# Patient Record
Sex: MALE
[De-identification: names, ages, dates, MRNs, and addresses within clinical notes are randomized per-mention and may not be internally consistent; named-entity substitution may affect disease eponyms.]

---

## 2018-11-01 ENCOUNTER — HOSPITAL ENCOUNTER (EMERGENCY)
Dept: HOSPITAL 90 - EDH | Age: 48
LOS: 1 days | Discharge: HOME | End: 2018-11-02
Payer: COMMERCIAL

## 2018-11-01 DIAGNOSIS — Z98.890: ICD-10-CM

## 2018-11-01 DIAGNOSIS — Z87.891: ICD-10-CM

## 2018-11-01 DIAGNOSIS — Z79.899: ICD-10-CM

## 2018-11-01 DIAGNOSIS — L03.116: Primary | ICD-10-CM

## 2018-11-01 LAB
BASOPHILS NFR BLD AUTO: 0.5 % (ref 0–5)
BUN SERPL-MCNC: 12 MG/DL (ref 7–18)
CHLORIDE SERPL-SCNC: 102 MMOL/L (ref 101–111)
CO2 SERPL-SCNC: 27 MMOL/L (ref 21–32)
CREAT SERPL-MCNC: 0.7 MG/DL (ref 0.5–1.5)
EOSINOPHIL NFR BLD AUTO: 0.8 % (ref 0–8)
ERYTHROCYTE [DISTWIDTH] IN BLOOD BY AUTOMATED COUNT: 14 % (ref 11–15.5)
GFR SERPL CREATININE-BSD FRML MDRD: 128 ML/MIN (ref 60–?)
GLUCOSE SERPL-MCNC: 151 MG/DL (ref 70–105)
HCT VFR BLD AUTO: 30.4 % (ref 42–54)
LYMPHOCYTES NFR SPEC AUTO: 29.4 % (ref 21–51)
MCH RBC QN AUTO: 31.1 PG (ref 27–33)
MCHC RBC AUTO-ENTMCNC: 35.7 G/DL (ref 32–36)
MCV RBC AUTO: 87.1 FL (ref 79–99)
MONOCYTES NFR BLD AUTO: 9 % (ref 3–13)
NEUTROPHILS NFR BLD AUTO: 60.3 % (ref 40–77)
NRBC BLD MANUAL-RTO: 0 % (ref 0–0.19)
PLATELET # BLD AUTO: 358 K/UL (ref 130–400)
POTASSIUM SERPL-SCNC: 4.2 MMOL/L (ref 3.5–5.1)
RBC # BLD AUTO: 3.49 MIL/UL (ref 4.5–6.2)
SODIUM SERPL-SCNC: 139 MMOL/L (ref 136–145)
WBC # BLD AUTO: 10.7 K/UL (ref 4.8–10.8)

## 2018-11-01 PROCEDURE — 85025 COMPLETE CBC W/AUTO DIFF WBC: CPT

## 2018-11-01 PROCEDURE — 93971 EXTREMITY STUDY: CPT

## 2018-11-01 PROCEDURE — 80048 BASIC METABOLIC PNL TOTAL CA: CPT

## 2018-11-01 PROCEDURE — 96374 THER/PROPH/DIAG INJ IV PUSH: CPT

## 2018-11-01 PROCEDURE — 99285 EMERGENCY DEPT VISIT HI MDM: CPT

## 2018-11-01 PROCEDURE — 36415 COLL VENOUS BLD VENIPUNCTURE: CPT

## 2019-07-14 ENCOUNTER — HOSPITAL ENCOUNTER (INPATIENT)
Dept: HOSPITAL 90 - 4AH | Age: 49
LOS: 2 days | Discharge: HOME | DRG: 560 | End: 2019-07-16
Attending: ORTHOPAEDIC SURGERY | Admitting: ORTHOPAEDIC SURGERY
Payer: COMMERCIAL

## 2019-07-14 VITALS — SYSTOLIC BLOOD PRESSURE: 143 MMHG | DIASTOLIC BLOOD PRESSURE: 67 MMHG

## 2019-07-14 VITALS — HEIGHT: 64 IN | BODY MASS INDEX: 44.22 KG/M2 | WEIGHT: 259 LBS

## 2019-07-14 VITALS — DIASTOLIC BLOOD PRESSURE: 89 MMHG | SYSTOLIC BLOOD PRESSURE: 150 MMHG

## 2019-07-14 VITALS — DIASTOLIC BLOOD PRESSURE: 66 MMHG | SYSTOLIC BLOOD PRESSURE: 146 MMHG
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
## 2019-07-14 DIAGNOSIS — M19.90: ICD-10-CM

## 2019-07-14 DIAGNOSIS — Z83.3: ICD-10-CM

## 2019-07-14 DIAGNOSIS — F17.200: ICD-10-CM

## 2019-07-14 DIAGNOSIS — E66.9: ICD-10-CM

## 2019-07-14 DIAGNOSIS — T84.54XA: Primary | ICD-10-CM

## 2019-07-14 DIAGNOSIS — Z96.653: ICD-10-CM

## 2019-07-14 DIAGNOSIS — D70.9: ICD-10-CM

## 2019-07-14 DIAGNOSIS — D64.9: ICD-10-CM

## 2019-07-14 DIAGNOSIS — E11.9: ICD-10-CM

## 2019-07-14 LAB
APTT PPP: 28.2 SEC (ref 26.3–35.5)
BASOPHILS NFR BLD MANUAL: 4 % (ref 0–2)
BUN SERPL-MCNC: 11 MG/DL (ref 7–18)
CHLORIDE SERPL-SCNC: 104 MMOL/L (ref 101–111)
CO2 SERPL-SCNC: 26 MMOL/L (ref 21–32)
CREAT SERPL-MCNC: 0.8 MG/DL (ref 0.5–1.5)
ERYTHROCYTE [DISTWIDTH] IN BLOOD BY AUTOMATED COUNT: 14.2 % (ref 11–15.5)
GFR SERPL CREATININE-BSD FRML MDRD: 110 ML/MIN (ref 60–?)
GLUCOSE SERPL-MCNC: 111 MG/DL (ref 70–105)
HCT VFR BLD AUTO: 29.5 % (ref 42–54)
INR PPP: 1 (ref 0.85–1.15)
LYMPHOCYTES NFR BLD MANUAL: 44 % (ref 22–44)
MCH RBC QN AUTO: 29.9 PG (ref 27–33)
MCHC RBC AUTO-ENTMCNC: 34.8 G/DL (ref 32–36)
MCV RBC AUTO: 85.9 FL (ref 79–99)
MONOCYTES NFR BLD MANUAL: 20 % (ref 2–9)
NEUTS SEG NFR BLD MANUAL: 20 % (ref 40–70)
NRBC BLD MANUAL-RTO: 0.1 % (ref 0–0.19)
PH UR STRIP: 7 [PH] (ref 5–8)
PLAT MORPH BLD: ADEQUATE
PLATELET # BLD AUTO: 213 K/UL (ref 130–400)
POTASSIUM SERPL-SCNC: 4.2 MMOL/L (ref 3.5–5.1)
PROTHROMBIN TIME: 10.5 SEC (ref 9.6–11.6)
RBC # BLD AUTO: 3.44 MIL/UL (ref 4.5–6.2)
RBC MORPH BLD: (no result)
SODIUM SERPL-SCNC: 141 MMOL/L (ref 136–145)
SP GR UR STRIP: 1.01 (ref 1–1.03)
UROBILINOGEN UR STRIP-MCNC: 0.2 MG/DL (ref 0.2–1)
VARIANT LYMPHS NFR BLD MANUAL: 12 % (ref 0–0)
WBC # BLD AUTO: 2.5 K/UL (ref 4.8–10.8)

## 2019-07-14 PROCEDURE — 86850 RBC ANTIBODY SCREEN: CPT

## 2019-07-14 PROCEDURE — 36415 COLL VENOUS BLD VENIPUNCTURE: CPT

## 2019-07-14 PROCEDURE — 81003 URINALYSIS AUTO W/O SCOPE: CPT

## 2019-07-14 PROCEDURE — 85610 PROTHROMBIN TIME: CPT

## 2019-07-14 PROCEDURE — 80048 BASIC METABOLIC PNL TOTAL CA: CPT

## 2019-07-14 PROCEDURE — 86922 COMPATIBILITY TEST ANTIGLOB: CPT

## 2019-07-14 PROCEDURE — 85730 THROMBOPLASTIN TIME PARTIAL: CPT

## 2019-07-14 PROCEDURE — 86901 BLOOD TYPING SEROLOGIC RH(D): CPT

## 2019-07-14 PROCEDURE — 87641 MR-STAPH DNA AMP PROBE: CPT

## 2019-07-14 PROCEDURE — 85651 RBC SED RATE NONAUTOMATED: CPT

## 2019-07-14 PROCEDURE — 86900 BLOOD TYPING SEROLOGIC ABO: CPT

## 2019-07-14 PROCEDURE — 85025 COMPLETE CBC W/AUTO DIFF WBC: CPT

## 2019-07-14 RX ADMIN — CELECOXIB SCH MG: 200 CAPSULE ORAL at 20:51

## 2019-07-14 RX ADMIN — PREGABALIN SCH MG: 25 CAPSULE ORAL at 20:51

## 2019-07-15 VITALS — SYSTOLIC BLOOD PRESSURE: 146 MMHG | DIASTOLIC BLOOD PRESSURE: 93 MMHG

## 2019-07-15 VITALS — DIASTOLIC BLOOD PRESSURE: 67 MMHG | SYSTOLIC BLOOD PRESSURE: 148 MMHG

## 2019-07-15 VITALS — SYSTOLIC BLOOD PRESSURE: 150 MMHG | DIASTOLIC BLOOD PRESSURE: 91 MMHG

## 2019-07-15 VITALS — DIASTOLIC BLOOD PRESSURE: 93 MMHG | SYSTOLIC BLOOD PRESSURE: 155 MMHG

## 2019-07-15 VITALS — SYSTOLIC BLOOD PRESSURE: 151 MMHG | DIASTOLIC BLOOD PRESSURE: 71 MMHG

## 2019-07-15 VITALS — SYSTOLIC BLOOD PRESSURE: 146 MMHG | DIASTOLIC BLOOD PRESSURE: 76 MMHG

## 2019-07-15 LAB
BASOPHILS NFR BLD AUTO: 0.5 % (ref 0–5)
BUN SERPL-MCNC: 10 MG/DL (ref 7–18)
CHLORIDE SERPL-SCNC: 105 MMOL/L (ref 101–111)
CO2 SERPL-SCNC: 27 MMOL/L (ref 21–32)
CREAT SERPL-MCNC: 0.8 MG/DL (ref 0.5–1.5)
EOSINOPHIL NFR BLD AUTO: 1.3 % (ref 0–8)
ERYTHROCYTE [DISTWIDTH] IN BLOOD BY AUTOMATED COUNT: 15 % (ref 11–15.5)
GFR SERPL CREATININE-BSD FRML MDRD: 110 ML/MIN (ref 60–?)
GLUCOSE SERPL-MCNC: 118 MG/DL (ref 70–105)
HCT VFR BLD AUTO: 29.6 % (ref 42–54)
INR PPP: 0.99 (ref 0.85–1.15)
LYMPHOCYTES NFR SPEC AUTO: 54 % (ref 21–51)
MCH RBC QN AUTO: 30.7 PG (ref 27–33)
MCHC RBC AUTO-ENTMCNC: 35.8 G/DL (ref 32–36)
MCV RBC AUTO: 85.8 FL (ref 79–99)
MONOCYTES NFR BLD AUTO: 31.2 % (ref 3–13)
NEUTROPHILS NFR BLD AUTO: 13 % (ref 40–77)
NRBC BLD MANUAL-RTO: 0.2 % (ref 0–0.19)
PLATELET # BLD AUTO: 204 K/UL (ref 130–400)
POTASSIUM SERPL-SCNC: 3.9 MMOL/L (ref 3.5–5.1)
PROTHROMBIN TIME: 10.4 SEC (ref 9.6–11.6)
RBC # BLD AUTO: 3.45 MIL/UL (ref 4.5–6.2)
SODIUM SERPL-SCNC: 142 MMOL/L (ref 136–145)
WBC # BLD AUTO: 2.3 K/UL (ref 4.8–10.8)

## 2019-07-15 RX ADMIN — TBO-FILGRASTIM SCH MCG: 480 INJECTION, SOLUTION SUBCUTANEOUS at 14:39

## 2019-07-15 RX ADMIN — FERROUS SULFATE TAB EC 324 MG (65 MG FE EQUIVALENT) SCH MG: 324 (65 FE) TABLET DELAYED RESPONSE at 09:41

## 2019-07-15 RX ADMIN — FAMOTIDINE SCH MG: 20 TABLET, FILM COATED ORAL at 09:41

## 2019-07-15 RX ADMIN — CELECOXIB SCH MG: 200 CAPSULE ORAL at 20:20

## 2019-07-15 RX ADMIN — TAMSULOSIN HYDROCHLORIDE SCH MG: 0.4 CAPSULE ORAL at 09:41

## 2019-07-15 RX ADMIN — PREGABALIN SCH MG: 25 CAPSULE ORAL at 20:20

## 2019-07-15 RX ADMIN — CELECOXIB SCH MG: 200 CAPSULE ORAL at 09:41

## 2019-07-15 RX ADMIN — PREGABALIN SCH MG: 25 CAPSULE ORAL at 09:41

## 2019-07-15 NOTE — NUR
DR. DIAZ CONSULT CALLED

SPOKE WITH CHATA AT DR. BA OFFICE, WILL PASS MESSAGE ALONG FOR NEW CONSULT REGARDING 
NEUTROPENIA PLACED BY DR. THOMASON.

## 2019-07-15 NOTE — NUR
PICC LINE REMOVED

PICC LINE REMOVED AS ORDERED, PICC TIP INTACT. PRESSURE HELD TO RIGHT UPPER ARM FOR 5 
MINUTES. PT TOLERATED REMOVAL WELL, WILL CONTINUE TO MONITOR PT CLOSELY.

## 2019-07-15 NOTE — NUR
INITIAL

MET WPT AND SPOUSE- AND FMAILY AT [facility]- PREIVOUSLY LIVING AT [facility] FOR 4 WEEKS BC HIS TKA 
GOT INFECTED AND HAD TO BE REMOVED.



PLAN FOR SURGERY TODAY- DELAYED SECOND TO LOW WBS, ONCOLOY AND ID CONSULT PENDING TODAY

-------------------------------------------------------------------------------

Addendum: 07/16/19 at 1941 by SANFORD GORDILLO RN CM

-------------------------------------------------------------------------------

Amended: Links added.

## 2019-07-16 VITALS — SYSTOLIC BLOOD PRESSURE: 139 MMHG | DIASTOLIC BLOOD PRESSURE: 70 MMHG

## 2019-07-16 VITALS — SYSTOLIC BLOOD PRESSURE: 127 MMHG | DIASTOLIC BLOOD PRESSURE: 67 MMHG

## 2019-07-16 LAB
BASOPHILS NFR BLD MANUAL: 1 % (ref 0–2)
EOSINOPHIL NFR BLD MANUAL: 1 % (ref 1–6)
ERYTHROCYTE [DISTWIDTH] IN BLOOD BY AUTOMATED COUNT: 14.9 % (ref 11–15.5)
HCT VFR BLD AUTO: 29.7 % (ref 42–54)
LYMPHOCYTES NFR BLD MANUAL: 22 % (ref 22–44)
MANUAL DIF COMMENT BLD-IMP: (no result)
MCH RBC QN AUTO: 29.5 PG (ref 27–33)
MCHC RBC AUTO-ENTMCNC: 34.6 G/DL (ref 32–36)
MCV RBC AUTO: 85.4 FL (ref 79–99)
METAMYELOCYTES NFR BLD MANUAL: 5 % (ref 0–0)
MONOCYTES NFR BLD MANUAL: 10 % (ref 2–9)
NEUTS BAND NFR BLD MANUAL: 42 % (ref 0–2)
NEUTS SEG NFR BLD MANUAL: 18 % (ref 40–70)
NRBC BLD MANUAL-RTO: 0.1 % (ref 0–0.19)
PLAT MORPH BLD: ADEQUATE
PLATELET # BLD AUTO: 217 K/UL (ref 130–400)
RBC # BLD AUTO: 3.48 MIL/UL (ref 4.5–6.2)
RBC MORPH BLD: (no result)
VARIANT LYMPHS NFR BLD MANUAL: 1 % (ref 0–0)
WBC # BLD AUTO: 13.6 K/UL (ref 4.8–10.8)

## 2019-07-16 RX ADMIN — TBO-FILGRASTIM SCH MCG: 480 INJECTION, SOLUTION SUBCUTANEOUS at 09:00

## 2019-07-16 RX ADMIN — FERROUS SULFATE TAB EC 324 MG (65 MG FE EQUIVALENT) SCH MG: 324 (65 FE) TABLET DELAYED RESPONSE at 08:46

## 2019-07-16 RX ADMIN — FAMOTIDINE SCH MG: 20 TABLET, FILM COATED ORAL at 08:47

## 2019-07-16 RX ADMIN — CELECOXIB SCH MG: 200 CAPSULE ORAL at 08:46

## 2019-07-16 RX ADMIN — PREGABALIN SCH MG: 25 CAPSULE ORAL at 08:47

## 2019-07-16 RX ADMIN — TAMSULOSIN HYDROCHLORIDE SCH MG: 0.4 CAPSULE ORAL at 08:46

## 2019-07-16 NOTE — NUR
WALKER NEEDED BY PT FOR DC

GOT CALL FROM  FITZ AT [facility] THAT PT WOULD NEED A WALKER PRIOR TO DISCHARGE



 ADVISED DR. THOMASON IN [facility].

 DR THOMASON  WROTE SCRIPT; SCRIPT TO RYLAND DANGELO.




-------------------------------------------------------------------------------

Addendum: 07/16/19 at 1943 by SANFORD GORDILLO RN CM

-------------------------------------------------------------------------------

Amended: Links added.

## 2019-07-24 ENCOUNTER — HOSPITAL ENCOUNTER (INPATIENT)
Dept: HOSPITAL 90 - DAHIP | Age: 49
LOS: 2 days | Discharge: HOME HEALTH | DRG: 468 | End: 2019-07-26
Attending: ORTHOPAEDIC SURGERY | Admitting: ORTHOPAEDIC SURGERY
Payer: COMMERCIAL

## 2019-07-24 VITALS — DIASTOLIC BLOOD PRESSURE: 79 MMHG | SYSTOLIC BLOOD PRESSURE: 134 MMHG

## 2019-07-24 VITALS — SYSTOLIC BLOOD PRESSURE: 142 MMHG | DIASTOLIC BLOOD PRESSURE: 81 MMHG

## 2019-07-24 VITALS — SYSTOLIC BLOOD PRESSURE: 134 MMHG | DIASTOLIC BLOOD PRESSURE: 69 MMHG

## 2019-07-24 VITALS — SYSTOLIC BLOOD PRESSURE: 140 MMHG | DIASTOLIC BLOOD PRESSURE: 90 MMHG

## 2019-07-24 VITALS — DIASTOLIC BLOOD PRESSURE: 85 MMHG | SYSTOLIC BLOOD PRESSURE: 141 MMHG

## 2019-07-24 VITALS — SYSTOLIC BLOOD PRESSURE: 149 MMHG | DIASTOLIC BLOOD PRESSURE: 79 MMHG

## 2019-07-24 VITALS — DIASTOLIC BLOOD PRESSURE: 87 MMHG | SYSTOLIC BLOOD PRESSURE: 145 MMHG

## 2019-07-24 VITALS — DIASTOLIC BLOOD PRESSURE: 89 MMHG | SYSTOLIC BLOOD PRESSURE: 161 MMHG

## 2019-07-24 VITALS — DIASTOLIC BLOOD PRESSURE: 86 MMHG | SYSTOLIC BLOOD PRESSURE: 138 MMHG

## 2019-07-24 VITALS — DIASTOLIC BLOOD PRESSURE: 89 MMHG | SYSTOLIC BLOOD PRESSURE: 151 MMHG

## 2019-07-24 VITALS — SYSTOLIC BLOOD PRESSURE: 139 MMHG | DIASTOLIC BLOOD PRESSURE: 71 MMHG

## 2019-07-24 VITALS — DIASTOLIC BLOOD PRESSURE: 84 MMHG | SYSTOLIC BLOOD PRESSURE: 145 MMHG

## 2019-07-24 VITALS — DIASTOLIC BLOOD PRESSURE: 83 MMHG | SYSTOLIC BLOOD PRESSURE: 133 MMHG

## 2019-07-24 VITALS — SYSTOLIC BLOOD PRESSURE: 133 MMHG | DIASTOLIC BLOOD PRESSURE: 60 MMHG

## 2019-07-24 VITALS — SYSTOLIC BLOOD PRESSURE: 147 MMHG | DIASTOLIC BLOOD PRESSURE: 92 MMHG

## 2019-07-24 VITALS — DIASTOLIC BLOOD PRESSURE: 86 MMHG | SYSTOLIC BLOOD PRESSURE: 160 MMHG

## 2019-07-24 VITALS — DIASTOLIC BLOOD PRESSURE: 87 MMHG | SYSTOLIC BLOOD PRESSURE: 135 MMHG

## 2019-07-24 VITALS — SYSTOLIC BLOOD PRESSURE: 163 MMHG | DIASTOLIC BLOOD PRESSURE: 92 MMHG

## 2019-07-24 VITALS — SYSTOLIC BLOOD PRESSURE: 154 MMHG | DIASTOLIC BLOOD PRESSURE: 85 MMHG

## 2019-07-24 VITALS — SYSTOLIC BLOOD PRESSURE: 158 MMHG | DIASTOLIC BLOOD PRESSURE: 82 MMHG

## 2019-07-24 VITALS — DIASTOLIC BLOOD PRESSURE: 89 MMHG | SYSTOLIC BLOOD PRESSURE: 145 MMHG

## 2019-07-24 VITALS — DIASTOLIC BLOOD PRESSURE: 85 MMHG | SYSTOLIC BLOOD PRESSURE: 128 MMHG

## 2019-07-24 VITALS — DIASTOLIC BLOOD PRESSURE: 56 MMHG | SYSTOLIC BLOOD PRESSURE: 129 MMHG

## 2019-07-24 VITALS — SYSTOLIC BLOOD PRESSURE: 133 MMHG | DIASTOLIC BLOOD PRESSURE: 75 MMHG

## 2019-07-24 VITALS — SYSTOLIC BLOOD PRESSURE: 149 MMHG | DIASTOLIC BLOOD PRESSURE: 85 MMHG

## 2019-07-24 VITALS — WEIGHT: 260 LBS | HEIGHT: 64 IN | BODY MASS INDEX: 44.39 KG/M2

## 2019-07-24 VITALS — SYSTOLIC BLOOD PRESSURE: 166 MMHG | DIASTOLIC BLOOD PRESSURE: 90 MMHG

## 2019-07-24 DIAGNOSIS — Z96.653: ICD-10-CM

## 2019-07-24 DIAGNOSIS — Z83.3: ICD-10-CM
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
## 2019-07-24 DIAGNOSIS — T84.54XA: Primary | ICD-10-CM
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
## 2019-07-24 LAB
APPEARANCE SPUN FLD: (no result)
BASOPHILS NFR BLD AUTO: 0.8 % (ref 0–5)
BASOPHILS NFR BLD MANUAL: 1 %
BODY FLD TYPE: (no result)
COLOR FLD: (no result)
EOSINOPHIL NFR BLD AUTO: 0.8 % (ref 0–8)
EOSINOPHIL NFR BLD MANUAL: 3 %
ERYTHROCYTE [DISTWIDTH] IN BLOOD BY AUTOMATED COUNT: 15.5 % (ref 11–15.5)
HCT VFR BLD AUTO: 35.4 % (ref 42–54)
LYMPHOCYTES NFR BLD MANUAL: 27 %
LYMPHOCYTES NFR SPEC AUTO: 36 % (ref 21–51)
MCH RBC QN AUTO: 30.2 PG (ref 27–33)
MCHC RBC AUTO-ENTMCNC: 34.6 G/DL (ref 32–36)
MCV RBC AUTO: 87.5 FL (ref 79–99)
MONOCYTES NFR BLD AUTO: 9.7 % (ref 3–13)
MONOCYTES NFR BLD MANUAL: 3 %
NEUTROPHILS NFR BLD AUTO: 52.7 % (ref 40–77)
NRBC BLD MANUAL-RTO: 0.1 % (ref 0–0.19)
PLATELET # BLD AUTO: 248 K/UL (ref 130–400)
RBC # BLD AUTO: 4.05 MIL/UL (ref 4.5–6.2)
RBC # FLD MANUAL: (no result) /CU. MM.
RBC # FLD MANUAL: 37 /CU. MM.
SPECIMEN VOL FLD: 6 ML
WBC # BLD AUTO: 6.2 K/UL (ref 4.8–10.8)

## 2019-07-24 PROCEDURE — 85025 COMPLETE CBC W/AUTO DIFF WBC: CPT

## 2019-07-24 PROCEDURE — 97039 UNLISTED MODALITY: CPT

## 2019-07-24 PROCEDURE — 73562 X-RAY EXAM OF KNEE 3: CPT

## 2019-07-24 PROCEDURE — 0SPD0JZ REMOVAL OF SYNTHETIC SUBSTITUTE FROM LEFT KNEE JOINT, OPEN APPROACH: ICD-10-PCS | Performed by: ORTHOPAEDIC SURGERY

## 2019-07-24 PROCEDURE — 0SRD0J9 REPLACEMENT OF LEFT KNEE JOINT WITH SYNTHETIC SUBSTITUTE, CEMENTED, OPEN APPROACH: ICD-10-PCS | Performed by: ORTHOPAEDIC SURGERY

## 2019-07-24 PROCEDURE — 80048 BASIC METABOLIC PNL TOTAL CA: CPT

## 2019-07-24 PROCEDURE — 36415 COLL VENOUS BLD VENIPUNCTURE: CPT

## 2019-07-24 PROCEDURE — 96374 THER/PROPH/DIAG INJ IV PUSH: CPT

## 2019-07-24 PROCEDURE — 0SPD0EZ REMOVAL OF ARTICULATING SPACER FROM LEFT KNEE JOINT, OPEN APPROACH: ICD-10-PCS | Performed by: ORTHOPAEDIC SURGERY

## 2019-07-24 PROCEDURE — 96375 TX/PRO/DX INJ NEW DRUG ADDON: CPT

## 2019-07-24 PROCEDURE — 87070 CULTURE OTHR SPECIMN AEROBIC: CPT

## 2019-07-24 PROCEDURE — 87076 CULTURE ANAEROBE IDENT EACH: CPT

## 2019-07-24 PROCEDURE — 89051 BODY FLUID CELL COUNT: CPT

## 2019-07-24 PROCEDURE — 85027 COMPLETE CBC AUTOMATED: CPT

## 2019-07-24 PROCEDURE — 87205 SMEAR GRAM STAIN: CPT

## 2019-07-24 RX ADMIN — FAMOTIDINE SCH MG: 20 TABLET, FILM COATED ORAL at 20:10

## 2019-07-24 RX ADMIN — ACETAMINOPHEN SCH MG: 500 TABLET, COATED ORAL at 18:35

## 2019-07-24 RX ADMIN — ACETAMINOPHEN SCH MG: 500 TABLET, COATED ORAL at 23:55

## 2019-07-24 RX ADMIN — SODIUM CHLORIDE SCH MLS/HR: 0.9 INJECTION, SOLUTION INTRAVENOUS at 18:35

## 2019-07-24 RX ADMIN — ASPIRIN SCH MG: 325 TABLET, FILM COATED ORAL at 20:09

## 2019-07-24 RX ADMIN — SODIUM CHLORIDE ONE GM: 9 INJECTION, SOLUTION INTRAVENOUS at 13:22

## 2019-07-24 RX ADMIN — PREGABALIN SCH MG: 25 CAPSULE ORAL at 20:10

## 2019-07-24 RX ADMIN — SODIUM CHLORIDE SCH GM: 9 INJECTION, SOLUTION INTRAVENOUS at 22:49

## 2019-07-24 RX ADMIN — SODIUM CHLORIDE ONE GM: 9 INJECTION, SOLUTION INTRAVENOUS at 14:06

## 2019-07-24 RX ADMIN — CELECOXIB SCH MG: 200 CAPSULE ORAL at 20:10

## 2019-07-24 NOTE — NUR
INITIAL

 MET W PT, KNOWN TO THIS CM, REDO OF TKA; INDP W ADLS, USES WALKER AT HOME, SPOUSE DRIVES AT 
THIS TIME, DCP IS HOME WITH HOME HEALTH, ORDERS FOR SAME HH  AS LAST EPISODE OF CARE- [facility], 
CONSENT SIGNED AND REFERRAL FAXED

-------------------------------------------------------------------------------

Addendum: 07/26/19 at 1931 by SANFORD GORDILLO RN CM

-------------------------------------------------------------------------------

Amended: Links added.

## 2019-07-24 NOTE — NUR
POTENTIAL FOR INFECTION:

SHAVED RIGHT KNEE/LEG PER LADONNA ASCENCIO FOLLOWED BY WIPING WITH VASILIY: 2% VASILIY CHLORHEXIDINE 
CLOTH PATIENTS PRE-OP SKIN PREP.

## 2019-07-25 VITALS — DIASTOLIC BLOOD PRESSURE: 77 MMHG | SYSTOLIC BLOOD PRESSURE: 135 MMHG

## 2019-07-25 VITALS — DIASTOLIC BLOOD PRESSURE: 67 MMHG | SYSTOLIC BLOOD PRESSURE: 150 MMHG

## 2019-07-25 VITALS — DIASTOLIC BLOOD PRESSURE: 83 MMHG | SYSTOLIC BLOOD PRESSURE: 137 MMHG

## 2019-07-25 VITALS — SYSTOLIC BLOOD PRESSURE: 140 MMHG | DIASTOLIC BLOOD PRESSURE: 62 MMHG

## 2019-07-25 VITALS — DIASTOLIC BLOOD PRESSURE: 83 MMHG | SYSTOLIC BLOOD PRESSURE: 156 MMHG

## 2019-07-25 VITALS — SYSTOLIC BLOOD PRESSURE: 124 MMHG | DIASTOLIC BLOOD PRESSURE: 60 MMHG

## 2019-07-25 LAB
BUN SERPL-MCNC: 15 MG/DL (ref 7–18)
CHLORIDE SERPL-SCNC: 105 MMOL/L (ref 101–111)
CO2 SERPL-SCNC: 24 MMOL/L (ref 21–32)
CREAT SERPL-MCNC: 1 MG/DL (ref 0.5–1.5)
ERYTHROCYTE [DISTWIDTH] IN BLOOD BY AUTOMATED COUNT: 15.8 % (ref 11–15.5)
GFR SERPL CREATININE-BSD FRML MDRD: 85 ML/MIN (ref 60–?)
GLUCOSE SERPL-MCNC: 244 MG/DL (ref 70–105)
HCT VFR BLD AUTO: 26.7 % (ref 42–54)
MCH RBC QN AUTO: 30.9 PG (ref 27–33)
MCHC RBC AUTO-ENTMCNC: 35.8 G/DL (ref 32–36)
MCV RBC AUTO: 86.4 FL (ref 79–99)
NRBC BLD MANUAL-RTO: 0 % (ref 0–0.19)
PLATELET # BLD AUTO: 244 K/UL (ref 130–400)
POTASSIUM SERPL-SCNC: 4.1 MMOL/L (ref 3.5–5.1)
RBC # BLD AUTO: 3.09 MIL/UL (ref 4.5–6.2)
SODIUM SERPL-SCNC: 139 MMOL/L (ref 136–145)
WBC # BLD AUTO: 12 K/UL (ref 4.8–10.8)

## 2019-07-25 RX ADMIN — SODIUM CHLORIDE SCH MLS/HR: 0.9 INJECTION, SOLUTION INTRAVENOUS at 02:29

## 2019-07-25 RX ADMIN — TAMSULOSIN HYDROCHLORIDE SCH MG: 0.4 CAPSULE ORAL at 08:18

## 2019-07-25 RX ADMIN — OXYCODONE HYDROCHLORIDE PRN MG: 5 TABLET ORAL at 13:14

## 2019-07-25 RX ADMIN — SODIUM CHLORIDE SCH MLS/HR: 0.9 INJECTION, SOLUTION INTRAVENOUS at 14:00

## 2019-07-25 RX ADMIN — OXYCODONE HYDROCHLORIDE PRN MG: 5 TABLET ORAL at 08:20

## 2019-07-25 RX ADMIN — PREGABALIN SCH MG: 25 CAPSULE ORAL at 19:45

## 2019-07-25 RX ADMIN — FAMOTIDINE SCH MG: 20 TABLET, FILM COATED ORAL at 19:45

## 2019-07-25 RX ADMIN — ACETAMINOPHEN SCH MG: 500 TABLET, COATED ORAL at 16:14

## 2019-07-25 RX ADMIN — CELECOXIB SCH MG: 200 CAPSULE ORAL at 19:45

## 2019-07-25 RX ADMIN — ACETAMINOPHEN SCH MG: 500 TABLET, COATED ORAL at 23:19

## 2019-07-25 RX ADMIN — ASPIRIN SCH MG: 325 TABLET, FILM COATED ORAL at 08:18

## 2019-07-25 RX ADMIN — FAMOTIDINE SCH MG: 20 TABLET, FILM COATED ORAL at 08:19

## 2019-07-25 RX ADMIN — OXYCODONE HYDROCHLORIDE PRN MG: 5 TABLET ORAL at 02:28

## 2019-07-25 RX ADMIN — PREGABALIN SCH MG: 25 CAPSULE ORAL at 08:18

## 2019-07-25 RX ADMIN — SODIUM CHLORIDE SCH GM: 9 INJECTION, SOLUTION INTRAVENOUS at 04:56

## 2019-07-25 RX ADMIN — Medication SCH GM: at 08:19

## 2019-07-25 RX ADMIN — CELECOXIB SCH MG: 200 CAPSULE ORAL at 08:18

## 2019-07-25 RX ADMIN — ASPIRIN SCH MG: 325 TABLET, FILM COATED ORAL at 19:45

## 2019-07-25 RX ADMIN — SODIUM CHLORIDE SCH MLS/HR: 0.9 INJECTION, SOLUTION INTRAVENOUS at 12:40

## 2019-07-25 RX ADMIN — OXYCODONE HYDROCHLORIDE PRN MG: 5 TABLET ORAL at 23:20

## 2019-07-25 RX ADMIN — ACETAMINOPHEN SCH MG: 500 TABLET, COATED ORAL at 08:19

## 2019-07-26 VITALS — SYSTOLIC BLOOD PRESSURE: 126 MMHG | DIASTOLIC BLOOD PRESSURE: 72 MMHG

## 2019-07-26 VITALS — SYSTOLIC BLOOD PRESSURE: 133 MMHG | DIASTOLIC BLOOD PRESSURE: 68 MMHG

## 2019-07-26 VITALS — DIASTOLIC BLOOD PRESSURE: 66 MMHG | SYSTOLIC BLOOD PRESSURE: 128 MMHG

## 2019-07-26 VITALS — DIASTOLIC BLOOD PRESSURE: 83 MMHG | SYSTOLIC BLOOD PRESSURE: 129 MMHG

## 2019-07-26 RX ADMIN — CELECOXIB SCH MG: 200 CAPSULE ORAL at 08:48

## 2019-07-26 RX ADMIN — Medication SCH GM: at 08:48

## 2019-07-26 RX ADMIN — TAMSULOSIN HYDROCHLORIDE SCH MG: 0.4 CAPSULE ORAL at 08:48

## 2019-07-26 RX ADMIN — ASPIRIN SCH MG: 325 TABLET, FILM COATED ORAL at 08:48

## 2019-07-26 RX ADMIN — PREGABALIN SCH MG: 25 CAPSULE ORAL at 08:48

## 2019-07-26 RX ADMIN — ACETAMINOPHEN SCH MG: 500 TABLET, COATED ORAL at 08:48

## 2019-07-26 RX ADMIN — FAMOTIDINE SCH MG: 20 TABLET, FILM COATED ORAL at 08:48

## 2019-07-26 RX ADMIN — OXYCODONE HYDROCHLORIDE PRN MG: 5 TABLET ORAL at 08:48

## 2019-07-26 RX ADMIN — ACETAMINOPHEN SCH MG: 500 TABLET, COATED ORAL at 16:08

## 2019-07-26 NOTE — NUR
NEW ORDER FOR 3/1 CHAIR



CALL TO CM AT [facility] RE [facility] FOR RAPID SERVICE- [facility] IN [place]- VERBAL CONSENT 
ONBTAINED FROM PT AND SPOUSE- ORDER FROM MD, SENT TO [facility]-

  

REP FROM [facility] JESS CALLED BACK AND SAID THAT  SAIDA WOULD BE SENDING A CMS FORM  FOR DR. THOMASON TO SIGN-  AND THAT THE PT COULD NOT GET THE 3/1 UNTIL  THE  FORM IS SIGNED . 

PT INFORMED AND AWARE OF THAT- STATES NO PROBLEM.

-------------------------------------------------------------------------------

Addendum: 07/26/19 at 1936 by SANFORD GORDILLO RN CM

-------------------------------------------------------------------------------

Amended: Links added.

## 2019-07-26 NOTE — NUR
Rec'd call back from Dr. Bernstein, notified of bleeding after drain removal.  Rec'd order to 
hold discharge for 2 hours and if no further bleeding, pt may go home with pressure dressing 
in place and instruct pt not to remove until am dressing change.  If bleeding continues, pt 
must stay overnight.  Orders entered.  Pt informed.  No further bleeding at present.

## 2019-07-26 NOTE — NUR
No further bleeding.  Pt instructed to keep pressure dressing in place per Dr. Bernstein' 
orders.  Pt called for his ride.

## 2019-07-26 NOTE — NUR
Dressing change and drain removal to left knee.  Left knee incision asymptomatic, well 
approximated with surgical glue.  No active drainage.  Painted with Betadine, and dressed 
with sterile 4x4's and Medipore tape.  Removed drain, tip intact.  Steady flow of dark, 
serosanguineous fluid. Pressure held for 2 minutes, but drainage continued.  So pressure 
held to 5 minutes more, then additional 10 minutes.  Drainage still continued, so bulky 
gauze and ace wrap applied using pressure.  Call placed to Dr. Bernstein to notify. No answer, 
message left on voicemail requesting call back.

## 2019-07-26 NOTE — NUR
Bleeding noted to ace wrap.  Dressing changed and steri stip applied, then new pressure 
dressing by MIA Wilks RN.

## 2019-07-26 NOTE — NUR
PIV removed at time of discharge. Tip intact. Hemostasis achieved and dressed w sterile 2x2 
and tape.  [facility]  Pt in stable 
condition at time of discharge.

## 2019-07-26 NOTE — NUR
Discharge instructions provided in the room, emphasis on Dr. Bernstein' discharge orders for 
activity, physical therapy, incision care, and follow up treatment, as well as s/s to 
monitor for, and when to seek emergency care vs dial 911.  



Written rx for asa, ferrous gluconate, and Norco given to pt.  Educated pt regarding 
purpose, route, frequency, and duration of treatment, as well as side effects and adverse 
effects.  



Pt has follow up appt with Dr. Bernstein on 8/14 @ 3:00 pm.
